# Patient Record
Sex: MALE | Race: WHITE
[De-identification: names, ages, dates, MRNs, and addresses within clinical notes are randomized per-mention and may not be internally consistent; named-entity substitution may affect disease eponyms.]

---

## 2019-03-14 ENCOUNTER — HOSPITAL ENCOUNTER (EMERGENCY)
Dept: HOSPITAL 74 - JERFT | Age: 64
Discharge: HOME | End: 2019-03-14
Payer: COMMERCIAL

## 2019-03-14 VITALS — HEART RATE: 107 BPM | DIASTOLIC BLOOD PRESSURE: 87 MMHG | SYSTOLIC BLOOD PRESSURE: 122 MMHG | TEMPERATURE: 98 F

## 2019-03-14 VITALS — BODY MASS INDEX: 35.3 KG/M2

## 2019-03-14 DIAGNOSIS — I10: ICD-10-CM

## 2019-03-14 DIAGNOSIS — F32.9: ICD-10-CM

## 2019-03-14 DIAGNOSIS — I48.91: ICD-10-CM

## 2019-03-14 DIAGNOSIS — Z79.01: ICD-10-CM

## 2019-03-14 DIAGNOSIS — J01.00: Primary | ICD-10-CM

## 2019-03-14 DIAGNOSIS — E78.00: ICD-10-CM

## 2019-03-14 DIAGNOSIS — Z91.14: ICD-10-CM

## 2019-03-14 NOTE — PDOC
History of Present Illness





- General


Chief Complaint: Headache


Stated Complaint: SINUS INFECTION


Time Seen by Provider: 03/14/19 17:06


History Source: Patient


Exam Limitations: Clinical Condition





- History of Present Illness


Initial Comments: 





03/14/19 19:08


Patient with history of hypertension present with complaint of 2 weeks history 

of  headache, nasal congestion, sinus pain. Patient reported he was seen by 

urgent care and week ago and was given amoxicillin antibiotics with Flovent 

nasal spray with no improvement. Patient denies dizziness, nausea, vomiting or 

lightheadedness. Denies any other symptoms


Timing/Duration: other (2 weeks)





Past History





- Past Medical History


Allergies/Adverse Reactions: 


 Allergies











Allergy/AdvReac Type Severity Reaction Status Date / Time


 


No Known Allergies Allergy   Verified 03/14/19 17:09











Home Medications: 


Ambulatory Orders





Digoxin [Lanoxin -] 0.125 mg PO DAILY #30 tablet 08/15/14 


Enalapril Maleate [Vasotec -] 5 mg PO DAILY #30 tablet 08/15/14 


Metoprolol Tartrate [Lopressor -] 50 mg PO BID #14 tablet 08/15/14 


Warfarin Na [Coumadin -] 5 mg PO DAILY@1800 #30 tablet 08/15/14 


Ipratropium Bromide 2 spray NS BID PRN #1 spray 03/14/19 


Methylprednisolone [Medrol Dose Rodrigo] 4 mg PO ASDIR #21 tablet 03/14/19 


levoFLOXacin [Levaquin -] 500 mg PO DAILY #7 tablet 03/14/19 








Cardiac Disorders: Yes (afib, non-compliant with coumadin)


COPD: No


HTN: Yes


Hypercholesterolemia: Yes


Psychiatric Problems: Yes (depression)





- Surgical History


Abdominal Surgery: Yes (sigmoid colon)





- Immunization History


Immunization Up to Date: Yes





- Suicide/Smoking/Psychosocial Hx


Smoking History: Never smoked


Have you smoked in the past 12 months: No


Information on smoking cessation initiated: No


Hx Alcohol Use: No


Drug/Substance Use Hx: No





**Review of Systems





- Review of Systems


Able to Perform ROS?: Yes


Is the patient limited English proficient: No


Constitutional: No: Chills, Fever


HEENTM: Yes: Symptoms Reported, See HPI, Nose Congestion, Other (sinus pain).  

No: Eye Pain, Blurred Vision, Tearing, Recent change in vision, Double Vision, 

Cataracts, Ear Pain, Ocular Prothesis, Ear Discharge, Nose Pain, Tinnitus, Nose 

Bleeding, Hearing Loss, Throat Pain, Throat Swelling, Mouth Pain, Dental 

Problems, Difficulty Swallowing, Mouth Swelling


Respiratory: No: Symptoms reported, See HPI, Cough, Orthopnea, Shortness of 

Breath, SOB with Exertion, SOB at Rest, Stridor, Wheezing, Productive cough, 

Hemoptysis, Other


Cardiac (ROS): No: Symptoms Reported, See HPI, Chest Pain, Edema, Irregular 

Heart Rate, Lightheadedness, Palpitations, Syncope, Chest Tightness, Other


ABD/GI: No: Nausea, Vomiting


Neurological: Yes: Headache (sinus pressure).  No: Dizziness


All Other Systems: Reviewed and Negative





*Physical Exam





- Vital Signs


 Last Vital Signs











Temp Pulse Resp BP Pulse Ox


 


 98.0 F   107 H  17   122/87   98 


 


 03/14/19 17:05  03/14/19 17:05  03/14/19 17:05  03/14/19 17:05  03/14/19 17:05














- Physical Exam


Comments: 





03/14/19 19:10


GENERAL:


Well developed, well nourished. Awake and alert. No acute distress.


HEENT: Moderate subjective tenderness to bilateral maxillary sinus with mild 

tenderness to frontal sinus. Normocephalic, atraumatic. PERRLA, EOMI. No 

conjunctival pallor. Sclera are non-icteric. Moist mucous membranes. Oropharynx 

is clear.


NECK: 


Supple. Full ROM. 


CARDIOVASCULAR:


Regular rate and rhythm. No murmurs, rubs, or gallops. Distal pulses are 2+ and 

symmetric. 


PULMONARY: 


No evidence of respiratory distress. Lungs clear to auscultation bilaterally. 

No wheezing, rales or rhonchi.


ABDOMINAL:


Soft. Non-tender. Non-distended. No rebound or guarding. No organomegaly. 

Normoactive bowel sounds. 


MUSCULOSKELETAL 


Normal range of motion at all joints. 


SKIN: 


Warm and dry. Normal capillary refill. No rashes. No jaundice. 


NEUROLOGICAL: 


Alert, awake, appropriate.  Gait is normal without ataxia.


PSYCHIATRIC: 


Cooperative. Good eye contact. Appropriate mood 


General Appearance: Yes: Nourished, Appropriately Dressed.  No: Apparent 

Distress





Moderate Sedation





- Procedure Monitoring


Vital Signs: 


Procedure Monitoring Vital Signs











Temperature  98.0 F   03/14/19 17:05


 


Pulse Rate  107 H  03/14/19 17:05


 


Respiratory Rate  17   03/14/19 17:05


 


Blood Pressure  122/87   03/14/19 17:05


 


O2 Sat by Pulse Oximetry (%)  98   03/14/19 17:05











Medical Decision Making





- Medical Decision Making





03/14/19 19:12


Patient presented with complaint of 2 weeks history of sinus pain, headache, 

nasal congestion and runny nose which has not been improving with a week of 

amoxicillin antibiotics and Flovent nasal spray.


Exam significant for moderate subjective tenderness to bilateral maxillary 

sinus with mild tenderness frontal sinus otherwise unremarkable exam.


Patient is stable for outpatient management for sinusitis with Medrol Rodrigo, 

Levaquin and Atrovent nasal spray with ENT follow-up.





*DC/Admit/Observation/Transfer


Diagnosis at time of Disposition: 


Sinus infection


Qualifiers:


 Sinusitis location: maxillary Chronicity: acute Recurrence: non-recurrent 

Qualified Code(s): J01.00 - Acute maxillary sinusitis, unspecified








- Discharge Dispostion


Disposition: HOME


Condition at time of disposition: Stable


Decision to Admit order: No





- Prescriptions


Prescriptions: 


Ipratropium Bromide 2 spray NS BID PRN #1 spray


 PRN Reason: nasal congestion


levoFLOXacin [Levaquin -] 500 mg PO DAILY #7 tablet


Methylprednisolone [Medrol Dose Rodrigo] 4 mg PO ASDIR #21 tablet





- Referrals


Referrals: 


Mariana Dumont MD [Primary Care Provider] - 





- Patient Instructions


Printed Discharge Instructions:  Sinusitis


Additional Instructions: 


Take medication as prescribed. Increase fluid intake. Follow-up with ENT if 

symptoms does not improve in 5 days.





- Post Discharge Activity

## 2019-03-14 NOTE — PDOC
Rapid Medical Evaluation


Time Seen by Provider: 03/14/19 17:06


Medical Evaluation: 


 Allergies











Allergy/AdvReac Type Severity Reaction Status Date / Time


 


No Known Allergies Allergy   Verified 08/13/14 09:09











03/14/19 17:06


I performed a brief in-person evaluation of this patient.





Chief complaint: Being treated with amox/flonase for sinus infection, pain not 

improving


Pertinent physical exam findings: Right frontal and maxillary sinus tenderness, 

nasal congestion


I have ordered the following: None





Patient will proceed to the ED for further evaluation.





**Discharge Disposition





- Diagnosis


 Sinus infection








- Referrals





- Patient Instructions





- Post Discharge Activity

## 2019-03-19 ENCOUNTER — HOSPITAL ENCOUNTER (EMERGENCY)
Dept: HOSPITAL 74 - JER | Age: 64
Discharge: HOME | End: 2019-03-19
Payer: COMMERCIAL

## 2019-03-19 VITALS — HEART RATE: 71 BPM | TEMPERATURE: 98.3 F | SYSTOLIC BLOOD PRESSURE: 118 MMHG | DIASTOLIC BLOOD PRESSURE: 72 MMHG

## 2019-03-19 VITALS — BODY MASS INDEX: 35.3 KG/M2

## 2019-03-19 DIAGNOSIS — I10: ICD-10-CM

## 2019-03-19 DIAGNOSIS — G47.33: ICD-10-CM

## 2019-03-19 DIAGNOSIS — I48.91: ICD-10-CM

## 2019-03-19 DIAGNOSIS — Z91.19: ICD-10-CM

## 2019-03-19 DIAGNOSIS — J34.89: Primary | ICD-10-CM

## 2019-03-19 DIAGNOSIS — Z79.01: ICD-10-CM

## 2019-03-19 LAB
BASOPHILS # BLD: 0.7 % (ref 0–2)
DEPRECATED RDW RBC AUTO: 15.6 % (ref 11.9–15.9)
EOSINOPHIL # BLD: 0.8 % (ref 0–4.5)
HCT VFR BLD CALC: 41.9 % (ref 35.4–49)
HGB BLD-MCNC: 14.3 GM/DL (ref 11.7–16.9)
LYMPHOCYTES # BLD: 28.5 % (ref 8–40)
MCH RBC QN AUTO: 27.9 PG (ref 25.7–33.7)
MCHC RBC AUTO-ENTMCNC: 34.3 G/DL (ref 32–35.9)
MCV RBC: 81.5 FL (ref 80–96)
MONOCYTES # BLD AUTO: 6.5 % (ref 3.8–10.2)
NEUTROPHILS # BLD: 63.5 % (ref 42.8–82.8)
PLATELET # BLD AUTO: 277 K/MM3 (ref 134–434)
PMV BLD: 7.6 FL (ref 7.5–11.1)
RBC # BLD AUTO: 5.14 M/MM3 (ref 4–5.6)
WBC # BLD AUTO: 13.4 K/MM3 (ref 4–10)

## 2019-03-19 NOTE — PDOC
Rapid Medical Evaluation


Medical Evaluation: 


 Allergies











Allergy/AdvReac Type Severity Reaction Status Date / Time


 


No Known Allergies Allergy   Verified 03/14/19 17:09








I have performed a brief in-person evaluation of this patient.


The patient presents with a chief complaint of: C/O frontal HA, sinus pain, 

dizziness x 3 weeks; was seen last week for sinusitis and discharged on 

Levaquin and Prednisone, but states not helping; +postnasal drip, mild 

congestion; denies fever, cough, vomiting


Pertinent physical exam findings: In NAD, +B/L frontal and maxillary sinus TTP, 

no purulent nasal drainage, oropharynx clear


I have ordered the following: labs, ct sinus


The patient will proceed to the ED for further evaluation.








03/19/19 15:04

## 2019-03-19 NOTE — PDOC
History of Present Illness





- General


Chief Complaint: Respiratory


Stated Complaint: DIZZINESS


Time Seen by Provider: 03/19/19 15:04


History Source: Patient





- History of Present Illness


Timing/Duration: reports: other





Past History





- Past Medical History


Allergies/Adverse Reactions: 


 Allergies











Allergy/AdvReac Type Severity Reaction Status Date / Time


 


No Known Allergies Allergy   Verified 03/14/19 17:09











Home Medications: 


Ambulatory Orders





levoFLOXacin [Levaquin -] 500 mg PO DAILY #7 tablet 03/14/19 


Atorvastatin Ca [Lipitor] 40 mg PO HS 03/19/19 


Diltiazem [Cardizem -] 120 mg PO DAILY 03/19/19 


Meclizine HCl [Antivert -] 25 mg PO QID #28 tablet 03/19/19 


Metoprolol Tartrate [Lopressor -] 100 mg PO DAILY 03/19/19 


Warfarin Na [Coumadin -] 2.5 mg PO DAILY@1800 03/19/19 








Cardiac Disorders: Yes (afib, non-compliant with coumadin)


COPD: No


HTN: Yes


Hypercholesterolemia: Yes


Psychiatric Problems: Yes (depression)





- Surgical History


Abdominal Surgery: Yes (sigmoid colon)





- Immunization History


Immunization Up to Date: Yes





- Suicide/Smoking/Psychosocial Hx


Smoking History: Never smoked


Have you smoked in the past 12 months: No


Hx Alcohol Use: No


Drug/Substance Use Hx: No





**Review of Systems





- Review of Systems


Constitutional: No: Chills, Fever


HEENTM: Yes: Nose Pain, Nose Congestion.  No: Ear Pain


Respiratory: No: Cough


Neurological: Yes: Headache.  No: Tingling





*Physical Exam





- Vital Signs


 Last Vital Signs











Temp Pulse Resp BP Pulse Ox


 


 97.8 F   68   20   117/93   98 


 


 03/19/19 15:05  03/19/19 15:05  03/19/19 15:05  03/19/19 15:05  03/19/19 15:05














- Physical Exam


General Appearance: Yes: Appropriately Dressed, Apparent Distress


HEENT: positive: Normal ENT Inspection, Normal Voice, TMs Normal, Pharynx 

Normal.  negative: Scleral Icterus (R), Scleral Icterus (L), Sinus Tenderness


Neck: positive: Supple.  negative: Lymphadenopathy (R), Lymphadenopathy (L)


Integumentary: positive: Dry, Warm


Neurologic: positive: Fully Oriented, Alert, Normal Mood/Affect





Moderate Sedation





- Procedure Monitoring


Vital Signs: 


Procedure Monitoring Vital Signs











Temperature  97.8 F   03/19/19 15:05


 


Pulse Rate  68   03/19/19 15:05


 


Respiratory Rate  20   03/19/19 15:05


 


Blood Pressure  117/93   03/19/19 15:05


 


O2 Sat by Pulse Oximetry (%)  98   03/19/19 15:05











ED Treatment Course





- LABORATORY


CBC & Chemistry Diagram: 


 03/19/19 15:26





 03/19/19 15:26





- ADDITIONAL ORDERS


Additional order review: 


 Laboratory  Results











  03/19/19





  15:26


 


Sodium  Cancelled


 


Potassium  Cancelled


 


Chloride  Cancelled


 


Carbon Dioxide  Cancelled


 


Anion Gap  Cancelled


 


BUN  Cancelled


 


Creatinine  Cancelled


 


Creat Clearance w eGFR  Cancelled


 


Random Glucose  Cancelled


 


Calcium  Cancelled








 











  03/19/19





  15:26


 


RBC  5.14


 


MCV  81.5


 


MCHC  34.3


 


RDW  15.6


 


MPV  7.6


 


Neutrophils %  63.5


 


Lymphocytes %  28.5  D


 


Monocytes %  6.5


 


Eosinophils %  0.8


 


Basophils %  0.7














Medical Decision Making





- Medical Decision Making





03/19/19 16:12





64 yo M, afib on coumadin, HTN, SHERRY  and has been using nasal pillows for 4 

months now, presents with ongoing nasal pressure, pain with frontal headache 3 

weeks.  Seen at an urgent care center 3 weeks ago and started on amoxicillin 

and flovent nasal spray, but states symptoms persisted, so was seen in the ER 

at [facility] 1 week ago and started on medrol dosepak, levaquin and atrovent 

nasal spray, which he completed but returns today with ongoing symptoms.  No 

nasal discharge, dental pain, fever, chills, dizziness, visual changes, n/v. 

Given ENT referral but has not yet f/u





See exam





Unlikely acute sinusitis, possible nasal irritation from dental plugs used for 

SHERRY


No  improvement w/ multiple courses of abx and steroids


-labs and CT pending from triage





03/19/19 17:06


WBC 13 on labs but suspect 2/2 recent course of steroids. CT read pending








03/19/19 18:38


CT read as as mild mucosal thickening of b/l ethmoid and maxillary sinuses, 

otherwise no acute pathology.  Of note, patient's cardiologist, Dr. Hernández, 

called to report that patient was seen by him today and was complaining of some 

vertiginous symptoms.  Recommends I sent pt home on meclizine.  Patient denies 

any dizziness at this time and no visual changes, nausea, vomiting, focal 

weakness, chest pain or shortness of breath and non-focal on exam.  

Prescription for small dose of meclizine given.  Patient now informs me that he 

has since made an appointment with an ENT doctor for next week and will follow-

up.  Patient also told to follow-up with his PMD this week 











*DC/Admit/Observation/Transfer


Diagnosis at time of Disposition: 


 Nasal discomfort








- Discharge Dispostion


Disposition: HOME


Condition at time of disposition: Good





- Prescriptions


Prescriptions: 


Meclizine HCl [Antivert -] 25 mg PO QID #28 tablet





- Referrals


Referrals: 


Hugo Rosen MD [Staff Physician] - 





- Patient Instructions


Additional Instructions: 


The cause of your symptoms are unclear, but possibly related to the nasal 

pillows used for your sleep apnea.


Please follow-up with your ENT next week as scheduled


Take meclizine for dizziness as needed


Please follow up with your PMD this week








- Post Discharge Activity

## 2023-02-08 ENCOUNTER — OFFICE (OUTPATIENT)
Dept: URBAN - METROPOLITAN AREA CLINIC 121 | Facility: CLINIC | Age: 68
Setting detail: OPHTHALMOLOGY
End: 2023-02-08
Payer: MEDICARE

## 2023-02-08 DIAGNOSIS — H25.13: ICD-10-CM

## 2023-02-08 DIAGNOSIS — H40.013: ICD-10-CM

## 2023-02-08 DIAGNOSIS — H16.223: ICD-10-CM

## 2023-02-08 DIAGNOSIS — H35.033: ICD-10-CM

## 2023-02-08 PROCEDURE — 99213 OFFICE O/P EST LOW 20 MIN: CPT | Performed by: OPHTHALMOLOGY

## 2023-02-08 ASSESSMENT — AXIALLENGTH_DERIVED
OS_AL: 23.3782
OD_AL: 23.4605
OD_AL: 23.8025
OS_AL: 23.2356

## 2023-02-08 ASSESSMENT — REFRACTION_AUTOREFRACTION
OS_SPHERE: -1.75
OS_AXIS: 139
OD_AXIS: 010
OS_CYLINDER: +2.25
OD_CYLINDER: +0.75
OD_SPHERE: -1.50

## 2023-02-08 ASSESSMENT — SPHEQUIV_DERIVED
OD_SPHEQUIV: -1.125
OS_SPHEQUIV: -0.25
OD_SPHEQUIV: -0.25
OS_SPHEQUIV: -0.625

## 2023-02-08 ASSESSMENT — PACHYMETRY
OD_CT_UM: 527
OS_CT_UM: 521
OD_CT_CORRECTION: 1
OS_CT_CORRECTION: 1

## 2023-02-08 ASSESSMENT — REFRACTION_MANIFEST
OD_SPHERE: -1.00
OS_ADD: +2.00
OS_CYLINDER: +1.50
OD_CYLINDER: +1.50
OD_AXIS: 10
OS_SPHERE: -1.00
OS_AXIS: 140
OD_ADD: +2.00

## 2023-02-08 ASSESSMENT — TONOMETRY
OS_IOP_MMHG: 16
OD_IOP_MMHG: 16

## 2023-02-08 ASSESSMENT — CONFRONTATIONAL VISUAL FIELD TEST (CVF)
OD_FINDINGS: FULL
OS_FINDINGS: FULL

## 2023-02-08 ASSESSMENT — KERATOMETRY
METHOD_AUTO_MANUAL: AUTO
OD_K1POWER_DIOPTERS: 43.75
OS_K2POWER_DIOPTERS: 46.00
OD_K2POWER_DIOPTERS: 44.50
OS_AXISANGLE_DEGREES: 120
OD_AXISANGLE_DEGREES: 020
OS_K1POWER_DIOPTERS: 43.50

## 2023-02-08 ASSESSMENT — SUPERFICIAL PUNCTATE KERATITIS (SPK)
OD_SPK: 2+
OS_SPK: 2+

## 2023-02-08 ASSESSMENT — VISUAL ACUITY
OS_BCVA: 20/50
OD_BCVA: 20/40

## 2023-07-19 ENCOUNTER — OFFICE (OUTPATIENT)
Dept: URBAN - METROPOLITAN AREA CLINIC 121 | Facility: CLINIC | Age: 68
Setting detail: OPHTHALMOLOGY
End: 2023-07-19
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
## 2023-07-19 PROCEDURE — NO SHOW FE NO SHOW FEE: Performed by: OPHTHALMOLOGY
